# Patient Record
Sex: FEMALE | Race: WHITE
[De-identification: names, ages, dates, MRNs, and addresses within clinical notes are randomized per-mention and may not be internally consistent; named-entity substitution may affect disease eponyms.]

---

## 2021-01-17 ENCOUNTER — HOSPITAL ENCOUNTER (EMERGENCY)
Dept: HOSPITAL 53 - M ED | Age: 26
Discharge: HOME | End: 2021-01-17
Payer: COMMERCIAL

## 2021-01-17 VITALS
BODY MASS INDEX: 26.84 KG/M2 | SYSTOLIC BLOOD PRESSURE: 119 MMHG | HEIGHT: 63 IN | DIASTOLIC BLOOD PRESSURE: 77 MMHG | WEIGHT: 151.46 LBS

## 2021-01-17 DIAGNOSIS — Z3A.35: ICD-10-CM

## 2021-01-17 DIAGNOSIS — R50.9: ICD-10-CM

## 2021-01-17 DIAGNOSIS — Z88.2: ICD-10-CM

## 2021-01-17 DIAGNOSIS — Z20.828: Primary | ICD-10-CM

## 2021-01-17 DIAGNOSIS — Z79.899: ICD-10-CM

## 2021-01-17 DIAGNOSIS — Z88.1: ICD-10-CM

## 2021-01-17 DIAGNOSIS — O99.013: ICD-10-CM

## 2021-01-17 DIAGNOSIS — O99.891: ICD-10-CM

## 2021-01-17 PROCEDURE — 99283 EMERGENCY DEPT VISIT LOW MDM: CPT

## 2021-01-17 NOTE — CCD
Summarization Of Episode

                             Created on: 2021



DIANE MEJIA

External Reference #: [id number]

: 1995

Sex: Female



Demographics





                          Address                   [address], [place]  [address]

 

                          Home Phone                [phone]

 

                          Preferred Language        English

 

                          Marital Status            

 

                          Congregation Affiliation     [employer]orationism

 

                          Race                      White

 

                          Ethnic Group              Not  or 





Author





                          Author                    [facility]

 

                          Organization              [facility]

 

                          Address                   Unknown

 

                          Phone                     Unavailable







Support





                Name            Relationship    Address         Phone

 

                UE              Next Of Kin     Unknown         Unavailable

 

                    [employer] Next Of Kin         [address]

[place]  [address]                    [phone]

 

                    ALIN MEJIA   Next Of Kin         [id number] [place]  [address]                    [phone]



                                  



Re-disclosure Warning




Insurance Providers

          



             Payer name   Policy type / Coverage type Policy ID    Covered party

 ID Covered 

party's relationship to ennis Policy Ennis             Plan Information

 

          [facility]           [id number]           2        

         [id number]



                                                                                
       



Results

          



                    ID                  Date                Data Source

 

                    [id number]         2020 02:02:00 PM EDT [facility]









          Name      Value     Range     Interpretation Code Description Data Mervat

rce(s) Supporting 

Document(s)

 

          SARS CORONAVIRUS 2 RNA                                         [facility]

    

 

                                        This lab was ordered by [facility] 

Laboratory and reported by [facility]. 









                                        Procedure

## 2021-02-13 ENCOUNTER — HOSPITAL ENCOUNTER (INPATIENT)
Dept: HOSPITAL 53 - M LDO | Age: 26
LOS: 2 days | Discharge: HOME | End: 2021-02-15
Attending: REGISTERED NURSE | Admitting: OBSTETRICS & GYNECOLOGY
Payer: COMMERCIAL

## 2021-02-13 VITALS — SYSTOLIC BLOOD PRESSURE: 137 MMHG | DIASTOLIC BLOOD PRESSURE: 81 MMHG

## 2021-02-13 VITALS — SYSTOLIC BLOOD PRESSURE: 164 MMHG | DIASTOLIC BLOOD PRESSURE: 74 MMHG

## 2021-02-13 VITALS — SYSTOLIC BLOOD PRESSURE: 156 MMHG | DIASTOLIC BLOOD PRESSURE: 87 MMHG

## 2021-02-13 VITALS — WEIGHT: 153 LBS | HEIGHT: 63 IN | BODY MASS INDEX: 27.11 KG/M2

## 2021-02-13 VITALS — DIASTOLIC BLOOD PRESSURE: 94 MMHG | SYSTOLIC BLOOD PRESSURE: 147 MMHG

## 2021-02-13 VITALS — DIASTOLIC BLOOD PRESSURE: 84 MMHG | SYSTOLIC BLOOD PRESSURE: 164 MMHG

## 2021-02-13 VITALS — DIASTOLIC BLOOD PRESSURE: 85 MMHG | SYSTOLIC BLOOD PRESSURE: 150 MMHG

## 2021-02-13 VITALS — SYSTOLIC BLOOD PRESSURE: 152 MMHG | DIASTOLIC BLOOD PRESSURE: 91 MMHG

## 2021-02-13 VITALS — DIASTOLIC BLOOD PRESSURE: 81 MMHG | SYSTOLIC BLOOD PRESSURE: 138 MMHG

## 2021-02-13 VITALS — DIASTOLIC BLOOD PRESSURE: 72 MMHG | SYSTOLIC BLOOD PRESSURE: 131 MMHG

## 2021-02-13 VITALS — SYSTOLIC BLOOD PRESSURE: 147 MMHG | DIASTOLIC BLOOD PRESSURE: 92 MMHG

## 2021-02-13 VITALS — SYSTOLIC BLOOD PRESSURE: 153 MMHG | DIASTOLIC BLOOD PRESSURE: 93 MMHG

## 2021-02-13 VITALS — DIASTOLIC BLOOD PRESSURE: 73 MMHG | SYSTOLIC BLOOD PRESSURE: 153 MMHG

## 2021-02-13 VITALS — DIASTOLIC BLOOD PRESSURE: 89 MMHG | SYSTOLIC BLOOD PRESSURE: 141 MMHG

## 2021-02-13 VITALS — SYSTOLIC BLOOD PRESSURE: 139 MMHG | DIASTOLIC BLOOD PRESSURE: 85 MMHG

## 2021-02-13 VITALS — SYSTOLIC BLOOD PRESSURE: 136 MMHG | DIASTOLIC BLOOD PRESSURE: 84 MMHG

## 2021-02-13 VITALS — DIASTOLIC BLOOD PRESSURE: 73 MMHG | SYSTOLIC BLOOD PRESSURE: 140 MMHG

## 2021-02-13 VITALS — SYSTOLIC BLOOD PRESSURE: 145 MMHG | DIASTOLIC BLOOD PRESSURE: 85 MMHG

## 2021-02-13 VITALS — DIASTOLIC BLOOD PRESSURE: 97 MMHG | SYSTOLIC BLOOD PRESSURE: 156 MMHG

## 2021-02-13 VITALS — DIASTOLIC BLOOD PRESSURE: 75 MMHG | SYSTOLIC BLOOD PRESSURE: 131 MMHG

## 2021-02-13 VITALS — SYSTOLIC BLOOD PRESSURE: 148 MMHG | DIASTOLIC BLOOD PRESSURE: 101 MMHG

## 2021-02-13 DIAGNOSIS — Z3A.39: ICD-10-CM

## 2021-02-13 DIAGNOSIS — O43.193: ICD-10-CM

## 2021-02-13 LAB
APPEARANCE UR: (no result)
BACTERIA UR QL AUTO: NEGATIVE
BILIRUB UR QL STRIP.AUTO: NEGATIVE
GLUCOSE UR QL STRIP.AUTO: NEGATIVE MG/DL
HCT VFR BLD AUTO: 36.5 % (ref 36–47)
HGB BLD-MCNC: 12.3 G/DL (ref 12–15.5)
HGB UR QL STRIP.AUTO: (no result)
KETONES UR QL STRIP.AUTO: NEGATIVE MG/DL
LEUKOCYTE ESTERASE UR QL STRIP.AUTO: (no result)
MCH RBC QN AUTO: 30.3 PG (ref 27–33)
MCHC RBC AUTO-ENTMCNC: 33.7 G/DL (ref 32–36.5)
MCV RBC AUTO: 89.9 FL (ref 80–96)
MUCOUS THREADS URNS QL MICRO: (no result)
NITRITE UR QL STRIP.AUTO: NEGATIVE
PH UR STRIP.AUTO: 7 UNITS (ref 5–9)
PLATELET # BLD AUTO: 272 10^3/UL (ref 150–450)
PROT UR QL STRIP.AUTO: NEGATIVE MG/DL
RBC # BLD AUTO: 4.06 10^6/UL (ref 4–5.4)
RBC # UR AUTO: 3 /HPF (ref 0–3)
SP GR UR STRIP.AUTO: 1.01 (ref 1–1.03)
SQUAMOUS #/AREA URNS AUTO: 1 /HPF (ref 0–6)
UROBILINOGEN UR QL STRIP.AUTO: 0.2 MG/DL (ref 0–2)
WBC # BLD AUTO: 13 10^3/UL (ref 4–10)
WBC #/AREA URNS AUTO: 98 /HPF (ref 0–3)

## 2021-02-13 RX ADMIN — SODIUM CHLORIDE, POTASSIUM CHLORIDE, SODIUM LACTATE AND CALCIUM CHLORIDE SCH MLS/HR: 600; 310; 30; 20 INJECTION, SOLUTION INTRAVENOUS at 10:24

## 2021-02-13 RX ADMIN — SODIUM CHLORIDE, POTASSIUM CHLORIDE, SODIUM LACTATE AND CALCIUM CHLORIDE SCH MLS/HR: 600; 310; 30; 20 INJECTION, SOLUTION INTRAVENOUS at 17:30

## 2021-02-13 RX ADMIN — Medication SCH MLS/HR: at 13:20

## 2021-02-13 RX ADMIN — Medication SCH MLS/HR: at 17:00

## 2021-02-13 NOTE — IPNPDOC
Obstetrical Progress Note


Date of Service


2021





Subjective


Pt c/o continued back pain and pressure asking for something different for 

comfort





Objective





Vital Signs








  Date Time  Temp Pulse Resp B/P (MAP) Pulse Ox O2 Delivery O2 Flow Rate FiO2


 


21 12:38  80 18 136/84 (101)    











Fetal Assessment


Fetal Heart Rate (FHR):  120 (doppler)


Variability:  Other (normal fetal heart rate on auscultation)





Tocometer


Contractions:  Yes


Frequency:  regular, every 1-3 min. (by palpation)


Duration:  greater than 60 seconds


Strength:  palpated as strong, resting tone palp/soft





Sterile Vaginal Examination


Dilation:  7 cm (arom clear with exam)


Effacement (%):  90%


Station:  -1, 0


Cervical Consistency:  Soft


Cervical Position:  Posterior


Fetal Postion/Presentation:  Cephalic presentation





Assessment and Plan


Age:  26


:  1


Term:  0


Pre-term:  0


Abortions:  0


Livin


EGA at Admission:  39 (+3)


Fetal Status:  Reassuring


Group B Streptococcus:  Positive


Anticipate:  Vaginal Delivery


Additional Comments


Pt assisted to hands and knees position and prepared for sterile water 

injections.  Requested to discontinue procedure immediately after initiating 

first two injections.  Assisted to return to a reverse position straddling the 

toilet with a warm compress on the low back for comfort while the tub filled 

with warm water.  Pt currently coping well with contractions with movement and 

breathing in the tub attended by her .





Continue oral hydration, saline lock, GBS prophylaxis, intermittent auscultation

q 30 min in active labor, monitor for change in fetal or maternal condition, 

evaluate for change as indicated, anticipate vaginal delivery.











ASHLEY SMART CNM       2021 15:02

## 2021-02-13 NOTE — CCD
Summarization Of Episode

                             Created on: 2021



DIANE MEJIA

External Reference #: [id number]

: 1995

Sex: Female



Demographics





                          Address                   [address]  [address]

 

                          Home Phone                [phone]

 

                          Preferred Language        English

 

                          Marital Status            

 

                          Christianity Affiliation     Mu-ism

 

                          Race                      White

 

                          Ethnic Group              Not  or 





Author





                          Author                    [facility]

 

                          Organization              [facility]

 

                          Address                   Unknown

 

                          Phone                     Unavailable







Support





                Name            Relationship    Address         Phone

 

                UE              Next Of Kin     Unknown         Unavailable

 

                    [employer] Next Of Kin         [address]

[place]  [address]                    [phone]

 

                    ALIN MEJIA   Next Of Kin         [id number] [place]  [address]                    [phone]



                                  



Re-disclosure Warning

          The records that you are about to access may contain information from 
federally-assisted alcohol or drug abuse programs. If such information is 
present, then the following federally mandated warning applies: This information
has been disclosed to you from records protected by federal confidentiality 
rules (42 CFR part 2). The federal rules prohibit you from making any further 
disclosure of this information unless further disclosure is expressly permitted 
by the written consent of the person to whom it pertains or as otherwise 
permitted by 42 CFR part 2. A general authorization for the release of medical 
or other information is NOT sufficient for this purpose. The Federal rules 
restrict any use of the information to criminally investigate or prosecute any 
alcohol or drug abuse patient.The records that you are about to access may 
contain highly sensitive health information, the redisclosure of which is 
protected by Article 27-F of the [facility] Public Health law. If you 
continue you may have access to information: Regarding HIV / AIDS; Provided by 
facilities licensed or operated by the [facility] Office of Mental Health; 
or Provided by the [facility] Office for People With Developmental 
Disabilities. If such information is present, then the following [place] 
mandated warning applies: This information has been disclosed to you from 
confidential records which are protected by state law. State law prohibits you 
from making any further disclosure of this information without the specific 
written consent of the person to whom it pertains, or as otherwise permitted by 
law. Any unauthorized further disclosure in violation of state law may result in
a fine or group home sentence or both. A general authorization for the release of 
medical or other information is NOT sufficient authorization for further disc
losure.                                                          



Insurance Providers

          



             Payer name   Policy type / Coverage type Policy ID    Covered party

 ID Covered 

party's relationship to ennis Policy Ennis             Plan Information

 

           [employer] [facility]           [id number]           2        

         [id number]



                                                                                
       



Results

          



                    ID                  Date                Data Source

 

                    [id number]         2021 12:12:00 PM EST [facility]









          Name      Value     Range     Interpretation Code Description Data Mervat

rce(s) Supporting 

Document(s)

 

          SARS coronavirus 2 RNA Not Detected                               [facility]

[place]     

 

                                        This lab was ordered by [facility] and reported by [facility]. 









                    ID                  Date                Data Source

 

                    [id number]         2020 02:02:00 PM EDT [facility]









          Name      Value     Range     Interpretation Code Description Data Mervat

rce(s) Supporting 

Document(s)

 

          SARS CORONAVIRUS 2 RNA                                         [facility]

    

 

                                        This lab was ordered by [facility] 

Laboratory and reported by [facility]. 







                                        Procedure

## 2021-02-14 VITALS — DIASTOLIC BLOOD PRESSURE: 64 MMHG | SYSTOLIC BLOOD PRESSURE: 116 MMHG

## 2021-02-14 VITALS — DIASTOLIC BLOOD PRESSURE: 65 MMHG | SYSTOLIC BLOOD PRESSURE: 114 MMHG

## 2021-02-14 VITALS — DIASTOLIC BLOOD PRESSURE: 81 MMHG | SYSTOLIC BLOOD PRESSURE: 125 MMHG

## 2021-02-14 VITALS — SYSTOLIC BLOOD PRESSURE: 132 MMHG | DIASTOLIC BLOOD PRESSURE: 63 MMHG

## 2021-02-14 LAB
HCT VFR BLD AUTO: 27 % (ref 36–47)
HGB BLD-MCNC: 9.2 G/DL (ref 12–15.5)
MCH RBC QN AUTO: 30.9 PG (ref 27–33)
MCHC RBC AUTO-ENTMCNC: 34.1 G/DL (ref 32–36.5)
MCV RBC AUTO: 90.6 FL (ref 80–96)
PLATELET # BLD AUTO: 265 10^3/UL (ref 150–450)
RBC # BLD AUTO: 2.98 10^6/UL (ref 4–5.4)
WBC # BLD AUTO: 17.9 10^3/UL (ref 4–10)

## 2021-02-14 RX ADMIN — Medication SCH TAB: at 09:08

## 2021-02-14 RX ADMIN — IBUPROFEN PRN MG: 800 TABLET, FILM COATED ORAL at 20:02

## 2021-02-14 RX ADMIN — IBUPROFEN PRN MG: 800 TABLET, FILM COATED ORAL at 06:38

## 2021-02-14 NOTE — IPNPDOC
Postpartum Progress Note


Date of Service:  2021


Postpartum Day#:  1


Postpartum Progress Note


SUBJECT: Bella is a 26-year-old  1 now Para 1 status post 

uncomplicated spontaneous vaginal delivery at 39-3/7 weeks' on 2021 of a 

male 8 pounds 6 ounces with post vaginal abrasion , doing well postpartum day # 

1. She has been ambulating, voiding spontaneously without issue and tolerating 

regular diet. Breast feeding without issue. Reports lochia is like a normal 

period. Patient is ambulating well. Reports some cramping with breastfeeding. 

Denies any pain. Voiding without difficulty.





OBJECTIVE: 


VITAL SIGNS: Within normal limits, afebrile.


Alert and oriented times three.


Breath sounds clear to auscultation.


Heart rate: Regular rate and rhythm, no murmurs, rubs or gallops.


Abdomen: Fundus firm at U-2. Soft, NTTP.


Small lochia.





ASSESSMENT: Bella is a 26-year-old  1 now Para 1 status post 

uncomplicated spontaneous vaginal delivery after presenting in active labor, 

delivered at 39-3/7 weeks', doing well on postpartum day 1. Vitals within normal

limits, afebrile, hemodynamically stable with no evidence of infection.





PLAN:


1. Discharge to home on day 2 pp.


2. Tylenol and Motrin for pain.


3. Encourage breast feeding and ambulation.


4. Provide assistance as needed





VS, I&O, 24H, Fishbone


Vital Signs/I&O





Vital Signs








  Date Time  Temp Pulse Resp B/P (MAP) Pulse Ox O2 Delivery O2 Flow Rate FiO2


 


21 06:00 98.1 88 16 114/65 (81) 97 Room Air  














I&O- Last 24 Hours up to 6 AM 


 


 21





 06:00


 


Intake Total 2175 ml


 


Output Total 550 ml


 


Balance 1625 ml











Laboratory Data


24H LABS


Laboratory Tests 2


21 06:41: Nucleated Red Blood Cells % (auto) 0.0


CBC/BMP


Laboratory Tests


21 06:41

















ASHLEY SMART CNM       2021 09:39

## 2021-02-15 VITALS — DIASTOLIC BLOOD PRESSURE: 66 MMHG | SYSTOLIC BLOOD PRESSURE: 144 MMHG

## 2021-02-15 RX ADMIN — Medication SCH TAB: at 08:57

## 2021-02-15 NOTE — IPNPDOC
Postpartum Progress Note


Date of Service:  Feb 15, 2021


Postpartum Day#:  2


Postpartum Progress Note


SUBJECT: Bella is a 26-year-old  1 now Para 1 status post spontaneous

vaginal delivery at 39-3/7 weeks' on 2021 of a male 8 pounds 6 ounces with 

post vaginal abrasion , doing well postpartum day # 2. Patient had Gestational 

HTN on vital sign review and denies any s/s of pre e this morning.





She has been ambulating, voiding spontaneously without issue and tolerating 

regular diet. Breast feeding without issue. Reports lochia is like a normal 

period. Reports some cramping with breastfeeding. Denies any pain. Voiding 

without difficulty.





OBJECTIVE: 


VITAL SIGNS: Within normal limits, afebrile.


Alert and oriented times three.


Normal work of breathing


Heart rate: Regular rate and rhythm,


Abdomen: Fundus firm at U-2. Soft, NTTP.


Small lochia.





ASSESSMENT: Bella is a 26-year-old  1 now Para 1 status post 

uncomplicated spontaneous vaginal delivery after presenting in active labor, 

delivered at 39-3/7 weeks', doing well on postpartum day 2. Vitals within normal

limits, afebrile, hemodynamically stable with no evidence of infection or pre e





PLAN:


1. Discharge to home today.


2. Tylenol and Motrin for pain.


3. Encourage breast feeding and ambulation.


4. using  barrier  for contraception


4. Provide assistance as needed





VS, I&O, 24H, Fishbone


Vital Signs/I&O





Vital Signs








  Date Time  Temp Pulse Resp B/P (MAP) Pulse Ox O2 Delivery O2 Flow Rate FiO2


 


21 17:56 98.2 75 18 132/63 (86) 99   


 


21 06:00      Room Air  














I&O- Last 24 Hours up to 6 AM 


 


 2/15/21





 06:00


 


Intake Total 240 ml


 


Balance 240 ml











Laboratory Data


24H LABS


Laboratory Tests 2


21 06:41: Nucleated Red Blood Cells % (auto) 0.0


21 15:00: Methicillin-Resist S.aureus DNA PCR NOT DETECTED


CBC/BMP


Laboratory Tests


21 06:41








Microbiology





Microbiology


21 Urine Culture, Received


          Pending











KAI GONZALEZ MD           Feb 15, 2021 05:56

## 2023-10-05 NOTE — HPEPDOC
Obstetrical History & Physical


General


Date of Admission


2021 at 08:26





History of Present Illness


26yo  ajit in active labor @term


Chief Complaint:  Contractions, term


Information Provided By:  Patient


Age:  25


:  1


Term:  0


Pre-term:  0


Abortions:  0


Livin





Prenatal Care


Prenatal Care:  [facility]





Prenatal Dating


Final EDC:  2021


Final EDC for Daily Update:  2021


Final EDC by:  1st trimester (US)


1st Trimester Date:  2020


Weeks + Days:  8 (+1)


Estimated Date of Confinement:  2021


EGA at Admission:  39 (+3)





Antepartum Course


Prenatal Diagnos(e)s


A1GDM, gestational anemia


Height (inches):  63


Pre-Pregnancy weight (lbs.):  120


Admission Weight (lbs.):  153


Change in Weight (lbs.):  33





Past Medical History


Past Obstetrical History :  


   Past Obstetrical History:  Primgravida


GYN History:  No pertinent history


Past Medical History


Medical History


UTI, staph infection of axilla x2, MRSA infection of chin x1


Surgical History:  Tonsilectomy, Other (I&D of axilla)





Family History


Significant Family History:  No pertinent family hx





Social History


Marital Status:  


Family situation:  Spouse/partner home


Psychosocial History:  No pertinent psych hx


* Smoker:  former Smoker


Alcohol:  Denies


Drugs:  denies





Abuse Violence Screening


Have you been hit/kicked/slapp:  No


Have you been sexually assault:  No





Prenatal Imunizations


Tdap status:  current


Influenza Status:  current





Allergies


Coded Allergies:  


     ciprofloxacin (Verified  Allergy, Intermediate, ITCHY THROAT, 21)


     clindamycin (Verified  Allergy, Unknown, 20)


   


   TONGUE SWELLING AND ITCHY THROAT


     sulfamethoxazole (Verified  Allergy, Unknown, 20)


   


   TONGUE SWELLING AND ITCHY THROAT


     trimethoprim (Verified  Allergy, Unknown, 20)


   


   TONGUE SWELLING AND ITCHY THROAT





Medications


Scheduled


Prenatal No.137/Iron/Folic Acd (Prenatal Vitamin Tablet) 1 Each Tablet, 1 TAB PO

DAILY





Miscellaneous Medications


Acetaminophen (Tylenol Extra Strength) 500 Mg Tablet, 500 MG PO





Physical Examination


Physical Examination


GENERAL: Alert and oriented times three.


BREAST: .


ABDOMEN: Gravid and non-tender to touch.


FETUS: fetus is vertex (VTX) by Leopold.


HEART RATE: Regular rate and rhythm.


LUNGS: Clear to auscultation (CTA).


EXTREMITIES: No edema. No clonus.





Vital Signs/I&O





Vital Signs








  Date Time  Temp Pulse Resp B/P (MAP) Pulse Ox O2 Delivery O2 Flow Rate FiO2


 


21 08:16  75 18 131/75 (93)    











Laboratory Data


24H LABS


Laboratory Tests 2


21 07:14: 


Urine Color YELLOW, Urine Appearance HAZY, Urine pH 7.0, Urine Specific Gravity 

1.008, Urine Protein NEGATIVE, Urine Glucose (Auto)(UA) NEGATIVE, Urine Ketones 

(Auto) NEGATIVE, Urine Blood 1+H, Urine Nitrite NEGATIVE, Urine Bilirubin 

NEGATIVE, Urine Urobilinogen 0.2, Urine Leukocyte Esterase (Auto) 3+H, Urine WBC

(Auto) 98H, Urine RBC (Auto) 3, Urine Hyaline Casts (Auto) 0, Urine Bacteria 

(Auto) NEGATIVE, Urine Squamous Epithelial Cells 1, Urine Mucus (Auto) SMALL, 

Urine Sperm (Auto) 


21 08:33: Serology Scanned Report Hepatitis B Testing


21 09:00: Nucleated Red Blood Cells % (auto) 0.0


CBC/BMP


Laboratory Tests


21 09:00











Pertinent Laboratoy Data


Blood Type:  O+


RBC Antibody Screen:  Negative


HIV:  Negative


Hepatitis B:  Negative


Rapid Plasma Reagin:  Nonreactive


Rubella:  Immune


Varicella:  Immune


Chlamydia/Gonorrhea:  Negative


Group B Streptococcus:  Positive


Quad Screen Test:  Negative


Cystic Fibrosis:  Negative


Glucose Tolerance Test:  150 (81/184/167/139)





Anatomy Ultrasound


Placenta Location:  Anterior


Normal Anatomy:  Yes


Placenta Previa:  No





Vaginal Examination


Dilation:  5 cm


Effacement:  100%


Station:  -2


Fetal Presentation:  Cephalic presentation





Fetal Assessment


Fetal Heart Rate (FHR):  120


Variability:  Moderate


Accelerations:  Positive


Decelerations:  None





Tocometer


Contractions:  Yes


Frequency:  regular, every 2-2 min.


Duration:  greater than 60 seconds


Strength:  palpated as moderate, resting tone palp/soft


Multi-drug resistant Organism:  MRSA (states hx requiring hospialization and 

I&D)





Assessment/Plan


Assessment


Bella is a 26-year-old  (G)1 para (P)0 at 39+3 weeks by 8+1-week 

ultrasound. Presents to Labor and Delivery (L&D) for c/o contractions.





Plan


Admit and orient.


 and consent.


Diet: regular.


Group B Streptococcus (GBS) positive. PCN prophylaxis initiated per protocol.


Labs and intravenous (IV) per unit protocol.


Counseled on Pitocin and augmentation of labor.


Lactated Ringers (LR): Bolus 1000 mL, then saline lock and encourage oral 

hydration.


Encourage maternal movement and position changes.  Intermittent auscultation per

[facility] protocols.


Anticipate [normal spontaneous delivery ()].


C-S as appropriate.











ASHLEY SMART CNM       2021 10:45
(2) Patient Placed in Bed